# Patient Record
(demographics unavailable — no encounter records)

---

## 2024-12-06 NOTE — IMAGING
[de-identified] : Physical exam of the right ankle and right foot: No effusion, no erythema or ecchymosis.  Good range of motion with dorsiflexion, plantarflexion, inversion and eversion.  Mild discomfort with inversion and eversion.  No tenderness to palpation over the medial or lateral malleoli.  There is tenderness to palpation over the insertion of the peroneal tendon.  No tenderness to palpation over the posterior tibial tendon.  No tenderness to palpation over the ankle ligaments.  No tense to palpation of the Achilles tendon, negative Hopkins test.  No tenderness to palpation over the calcaneus.  Mild tenderness to palpation over the medial aspect of the plantar surface just distal to the calcaneus.  No Lisfranc joint tenderness to palpation.  No tenderness to palpation over the metatarsals.  Intact to light touch.

## 2024-12-06 NOTE — DISCUSSION/SUMMARY
[de-identified] : I reviewed the x-ray findings with the patient.  I explained to her that in my opinion she may have 2 problems: Peroneal tendinitis and a component of plantar fasciitis.  She has already been doing exercises with rolling a water bottle under the arch of her foot.  When she does take the Motrin she takes 1 tablet, I recommend she take at least 2 tablets of Motrin at a time.  I recommended warm water soaks with Epsom salt.  She may continue using a compression wrap.  She will start home therapy exercises for her right ankle and foot, she was given a printout from [employer] for an ankle conditioning program as well as a printout from [employer] for plantar fasciitis exercises.  I also recommended [employer] green inserts for when she wears sneakers.  She understands that these problems may persist for a while.  She will follow-up in 1 month for further evaluation with Dr. Colin.

## 2024-12-06 NOTE — DATA REVIEWED
[Ankle] : ankle [Right] : of the right [Foot] : foot [FreeTextEntry1] : 3 weightbearing views of the right ankle were obtained in the office today and show: No fracture or dislocation.  No widening of the ankle mortise. [FreeTextEntry2] : 3 weightbearing views of the right foot were obtained here in the office today and show: No fracture or dislocation.  No Lisfranc joint widening.

## 2024-12-06 NOTE — HISTORY OF PRESENT ILLNESS
[de-identified] : The patient is a 41-year-old female here for an evaluation of her right ankle and right foot.  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  She points behind the lateral malleolus and to the insertion point of the peroneal tendon as to where her pain is, it occasionally wraps around to the malleolar zone.  She also reports pain just distal to the calcaneus over the medial aspect of the midfoot.  That pain is intermittent in nature.  She has used ice, heat a compression wrap and Motrin which have been providing her with some relief.  The patient relates that she feels better wearing a higher boot to work.

## 2025-07-18 NOTE — DATA REVIEWED
[FreeTextEntry1] : B/L Screening Mammo - 07/10/2025: The breasts are extremely dense, which lowers the sensitivity of mammography.  Finding 1:  There is a stable asymmetry seen in the left breast.  Finding 2:  There is an area of benign architectural distortion corresponding to the site of surgery seen in the right breast.  No suspicious mass, grouping of calcifications, or other abnormality is identified.  IMPRESSION: There is no mammographic evidence of malignancy.  RECOMMENDATION: Unless otherwise indicated by clinical findings, annual screening mammography recommended.  ASSESSMENT: BI-RADS Category 2:  Benign  B/L Breast Sono - 07/10/2025: Findings: No suspicious solid or cystic lesion is seen in either breast. Scattered bilateral simple cysts are benign. Previously biopsied 7 mm mass in the left breast at 4:00 3 cm from the nipple is unchanged    Impression: No suspicious sonographic lesion identified.  Recommendation: Unless otherwise indicated by clinical findings, annual screening mammography recommended.  BI-RADS Category 2: Benign

## 2025-07-18 NOTE — REASON FOR VISIT
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

## 2025-07-18 NOTE — PAST MEDICAL HISTORY
[Menstruating] : The patient is menstruating [Menarche Age ____] : age at menarche was [unfilled] [Definite ___ (Date)] : the last menstrual period was [unfilled] [Normal Amount/Duration] : it was of a normal amount and duration [Regular Cycle Intervals] : have been regular [Total Preg ___] : G[unfilled] [Live Births ___] : P[unfilled]  [Age At Live Birth ___] : Age at live birth: [unfilled] [History of Hormone Replacement Treatment] : has no history of hormone replacement treatment [FreeTextEntry5] :  x 2 [FreeTextEntry6] : denies [FreeTextEntry7] : yes in past x 1 year, stopped 5 years prior  [FreeTextEntry8] : denies

## 2025-07-18 NOTE — HISTORY OF PRESENT ILLNESS
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  She has no breast related complaints at this time.  She denies any breast pain although she does have cyclical breast tenderness, has not palpated any new masses in either breast and denies any nipple discharge or retraction.    Her work up was as follows:  19 -- b/l screening tomosynthesis  -heterogenously dense breasts  -area of benign architectural distortion correlates with surgery in Right breast  -no suspicious mass, calcifications or other abnormalities BIRADS 2  19 -- b/l US  -R: no suspicious solid/cystic masses  -L: @4N3, indeterminate hypoechoic mass, measuring 0.8 x 0.7 x 0.4 cm --> BIOPSY; no additional masses noted BIRADS 4  19 -- L US CNBx @4N3 -fibroadenoma   HISTORICAL RISK FACTORS:  -2 prior RIGHT lumpectomies revealing FC changes, and fibroadenomatoid change (2/10/12 and 3/3/17) -family history of breast cancer in a paternal aunt at age 32  -, age at first live birth was 30  -prior OCP use in past x 1 year, stopped 5 years prior  RISK ASSESSMENT:  [place] 5yr -- 1.4% lifetime -- 23.8%  TC v6 5yr -- 0.8% lifetime -- 17.5%  INTERVAL HISTORY:  Theresa is a 38 F with a left breast fibroadenoma, biopsied 2 years prior, and benign high risk disease.   She has no breast related complaints at this time.  She denies any breast pain, has not palpated any new palpable masses in either breast and denies any nipple discharge or retraction.    Her most recent imaging was performed on 2021, a b/l screening mammogram and US, which revealed stability of her biopsy proven fibroadenoma @4N3, measuring 7 x 6 x 3 mm, and benign post surgical changes in her right breast, deemed BIRADS 2.    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 She has no breast related complaints at this time.  She denies any breast pain, has not palpated any new palpable masses in either breast and denies any nipple discharge or retraction.  Most recent imaging: B/L Screening Mammo - 2022: -The breasts are heterogeneously dense. -There is a stable area of benign architectural distortion corresponding to the site of surgery seen in the right breast. -There is no mammographic evidence of malignancy. BI-RADS Category 2:  Benign  B/L Breast Sono - 2022: -At the left breast 4:00 position 3 cm from the nipple, there is a biopsy-proven benign mass measuring 0.7 x 0.7 x 0.4 cm. This is unchanged compared to prior exam.  -Several simple cysts are noted in the left upper outer quadrant.  -No sonographic evidence of malignancy. 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 She has no breast related complaints at this time.  She denies any breast pain, has not palpated any new palpable masses in either breast and denies any nipple discharge or retraction.  Most recent imaging: B/L Screening Mammo - 2023: -The breasts are extremely dense, which lowers the sensitivity of mammography. -There is a stable asymmetry seen in the left breast. -There is no mammographic evidence of malignancy. BI-RADS Category 2:  Benign  B/L Breast Sono - 2023: -Stable benign mass is seen in the left breast, 4:00 axis, 3 cm from the nipple measuring up to 0.7 cm.  -Benign cysts are seen in both breasts.  -There is no axillary adenopathy. -No sonographic evidence of malignancy in either breast. BI-RADS Category 2: Benign  She has not had B/L breast MRI for high risk screening.   She presents today for evaluation and imaging review.   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 She has no breast related complaints at this time.  She denies any breast pain, has not palpated any new palpable masses in either breast and denies any nipple discharge or retraction.  Most recent imaging: B/L Screening Mammo & Sono - 2024: -The breasts are extremely dense, which lowers the sensitivity of mammography. -Asymmetry is seen in the right retroareolar breast, indeterminate. -Asymmetry is seen in the left medial breast, indeterminate. -Small circumscribed masses consistent with benign cysts as seen on concurrent ultrasound. -Architectural distortion in the right breast consistent with prior surgery. ULTRASOUND: -Probable complicated cyst in the left breast, 5:00 axis, 4 cm from the nipple measures 0.9 x 0.7 x 0.5 cm.  Targeted diagnostic ultrasound recommended.  -Stable benign mass is seen in the left breast, 4:00 axis, 3 cm from the nipple measuring up to 0.7 cm.  -Benign cysts are seen in both breasts.  -No suspicious masses are identified in the right breast or remaining left breast. -There is no axillary adenopathy. BI-RADS Category 0: Incomplete: Need Additional Imaging Evaluation  B/L Dx Mammo & Sono - 2024: -The breasts are extremely dense, which lowers the sensitivity of mammography. -Asymmetry in the right retroareolar breast noted on the screening mammogram effaces on spot compression views and is confluent with the surrounding fibroglandular tissue.  -Findings are consistent with benign breast tissue.  -Circumscribed mass in the left medial breast corresponds to a benign cyst seen on concurrent ultrasound.  -No suspicious masses, calcifications, or areas of architectural distortion are seen. Targeted bilateral breast ultrasound was performed. -No suspicious masses either seen in the retroareolar aspects of both breasts as well as the left medial breast.  -Benign simple cysts are seen in the left medial breast, cyst in the 7-8 o'clock axis of the left breast, 3 to 4 cm from the nipple measures up to 0.5 cm and corresponds to the mass seen on mammogram. -Benign complicated cyst with internal floating debris in the left breast, 5:00 axis, 4 cm from the nipple corresponds to the mass seen on screening ultrasound. This measures 0.9 x 0.8 x 0.5 cm. BI-RADS Category 2: Benign  She has not had B/L breast MRI for high-risk screening.   She presents today for evaluation and imaging review.   2025 -- THERESA HENLEY is a 42-year-old female patient who presents today in follow up for h/o left breast fibroadenoma and benign high-risk disease. She has no breast related complaints at this time.  She denies any breast pain, has not palpated any new palpable masses in either breast and denies any nipple discharge or retraction.  Most recent imaging: B/L Screening Mammo - 07/10/2025: -The breasts are extremely dense, which lowers the sensitivity of mammography. -There is a stable asymmetry seen in the left breast. -There is an area of benign architectural distortion corresponding to the site of surgery seen in the right breast. -No suspicious mass, grouping of calcifications, or other abnormality is identified. IMPRESSION: There is no mammographic evidence of malignancy. BI-RADS Category 2:  Benign  B/L Breast Sono - 07/10/2025: -No suspicious solid or cystic lesion is seen in either breast.  -Scattered bilateral simple cysts are benign.  -Previously biopsied 7 mm mass in the left breast at 4:00 3 cm from the nipple is unchanged. Impression: No suspicious sonographic lesion identified. BI-RADS Category 2: Benign  She has not had B/L breast MRI for high-risk screening.   She presents today for evaluation and imaging review.

## 2025-07-18 NOTE — PHYSICAL EXAM
[Normocephalic] : normocephalic [Atraumatic] : atraumatic [No Supraclavicular Adenopathy] : no supraclavicular adenopathy [Symmetrical] : symmetrical [No dominant masses] : no dominant masses in right breast  [No dominant masses] : no dominant masses left breast [No Nipple Discharge] : no left nipple discharge [No Rashes] : no rashes [No Ulceration] : no ulceration [Breast Nipple Inversion] : nipples not inverted [Breast Nipple Retraction] : nipples not retracted [de-identified] : multiple bilateral nondiscrete nodularities, however no suspicious masses palpated in either breast  [de-identified] : surgical incision in lateral breast is well healed  [de-identified] : No axillary lymphadenopathy appreciated. [de-identified] : No axillary lymphadenopathy appreciated.

## 2025-07-18 NOTE — ASSESSMENT
[FreeTextEntry1] : ADINA HENLEY is a 42-year-old female patient who presents today in follow up for h/o left breast fibroadenoma and benign high-risk disease. She has no breast related complaints at this time.  She denies any breast pain, has not palpated any new palpable masses in either breast and denies any nipple discharge or retraction.  B/L Screening Mammo - 07/10/2025: -The breasts are extremely dense, which lowers the sensitivity of mammography. -There is a stable asymmetry seen in the left breast. -There is an area of benign architectural distortion corresponding to the site of surgery seen in the right breast. -No suspicious mass, grouping of calcifications, or other abnormality is identified. IMPRESSION: There is no mammographic evidence of malignancy. BI-RADS Category 2:  Benign  B/L Breast Sono - 07/10/2025: -No suspicious solid or cystic lesion is seen in either breast.  -Scattered bilateral simple cysts are benign.  -Previously biopsied 7 mm mass in the left breast at 4:00 3 cm from the nipple is unchanged. Impression: No suspicious sonographic lesion identified. BI-RADS Category 2: Benign   She has not had B/L breast MRI for high-risk screening.    We again discussed high risk screening with breast MRIs - she is reluctant to move forward with this at this time. She understands that if she should decide she wants to have this imaging done - it ideally should be performed in January 2026 to obtain six-month alternation with mammo/sono imaging.  We also had a discussion regarding genetic testing - her new gynecologist recommended she have this done. She will be given the information for Elda Saavedra, genetic counselor so she can make an appointment for consultation.    We discussed dense breasts.  Increasing breast density has been found to increase ones risk of breast cancer, but at this time, there is no clear indication for additional imaging in this setting, as both US and MRI have not been found to improve survival.  One can consider bilateral screening US.  However, out of 1000 women screened, the use of routine US will only identify an additional 3-5 cancers.  The use of US was found to increase the likelihood of undergoing more imaging and more biopsies.  She does have dense breasts.  We have decided to proceed with screening bilateral breast US at this time.  This will be scheduled with her next screening mammogram.  We discussed fibroadenomas.  These are benign lesions without any malignant potential.  They are hormonally influenced and can increase or decrease in size and can also regress spontaneously.  They are considered proliferative lesions without atypia.  Patients with these lesions have been found to have a slightly increased relative risk of breast cancer compared to the reference population.  Surgical excision is recommended when the diagnosis in unclear, the lesion is causing pain or breast deformity, or if it is rapidly enlarging.   The reason that we recommend surgical excision for a rapidly enlarging fibroadenoma is because there is a possibility that this could be a Phyllodes Tumor.  The treatment of this lesion is wide local excision with 1 cm margins.  A sentinel lymph node would not be necessary as this disease very rarely spreads to the lymph nodes.   At this time, she is asymptomatic from her left breast fibroadenoma, so no acute surgical intervention is indicated at this time.   In regard to her family history of breast cancer in her paternal aunt and her personal history of surgical excisions, her risk assessment is as follows:  RISK ASSESSMENT:  [place] 5yr -- 1.4% lifetime -- 23.8%  TC v6 5yr -- 0.8% lifetime -- 17.5%  THis put her at an intermediate to high risk for breast cancer.   She qualifies for annual screening MRIs which would be done in an alternating fashion with her screening mammograms such that an imaging study and clinical breast exam would be performed every 6 months.  The use of MRIs have not been shown to prolong survival, however out of 1000 women screened, an additional 14-15 cancers will be identified.  The use of MRIs, has, however, been shown to increase the number of procedures and additional imaging because although it is a very sensitive test, it is not as specific.  This was discussed with the patient and she would like to think about if she wants to proceed with screening MRIs.    She does not quite qualify for chemoppx medications at this time.   ---  I spent a total of 20 minutes of face-to-face time with this patient, greater than 50% of which was spent in counseling and/or coordination of care. All of her questions were appropriately answered. She knows to call with any concerns.  PLAN:  -B/L Breast MRI (if pt would like to proceed) - January 2026. -If benign, B/L Screening Mammo & Sono - July 2026. -follow-up after. -Info for genetic counseling again given.